# Patient Record
(demographics unavailable — no encounter records)

---

## 2025-03-20 NOTE — CARDIOLOGY SUMMARY
[de-identified] : 12.9.24 NSR with WPW pattern type B 67bpm 10.2020: NSR RSR v1 nondiagnostic  9.2019: NSR low voltage in precordial leads, RSR v1 nondiagnostic  [de-identified] : Holter monitor 6 days 12/9/2024: predominate NSR with intermittent episodes of WPW present. mean HR 75bpm, occasional PACs, few episdoes of pSVT longest 6 beats fastest with HR of 163 beats [de-identified] : TTE 1/7/2025 LVEF 60%, no LA dilation, mild-mod AR, mild MR/TR, trace KS,

## 2025-03-20 NOTE — ASSESSMENT
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 No evidence of WPW on resting ECG today.  No symptoms of SVT. \par  \par  \par  Will obtain Echo and monitor from Dr. Cruz.

## 2025-03-20 NOTE — HISTORY OF PRESENT ILLNESS
[FreeTextEntry1] : This is a 67-year-old-woman with a past history of WPW pattern on her ECG. She has not undergone ablation. She has had no arrhythmia symptoms since her last visit.   SHe had a echo and 24 hour monitor this year with Dr. Cruz.     JOHN KNOX  denies chest pain, chest pressure, shortness of breath, lightheadedness, dizziness, palpitations, syncope, presyncope, orthopnea, PND, or edema.

## 2025-07-17 NOTE — CARDIOLOGY SUMMARY
[de-identified] : 7.17.25:  12.9.24 NSR with WPW pattern type B 67bpm 10.2020: NSR RSR v1 nondiagnostic  9.2019: NSR low voltage in precordial leads, RSR v1 nondiagnostic  [de-identified] : Holter monitor 6 days 12/9/2024: predominate NSR with intermittent episodes of WPW present. mean HR 75bpm, occasional PACs, few episdoes of pSVT longest 6 beats fastest with HR of 163 beats [de-identified] : TTE 1/7/2025 LVEF 60%, no LA dilation, mild-mod AR, mild MR/TR, trace [place],

## 2025-07-17 NOTE — HISTORY OF PRESENT ILLNESS
[FreeTextEntry1] : This is a 67-year-old-woman with a past history of WPW pattern on her ECG. She has not undergone ablation. She has had no arrhythmia symptoms since her last visit. She repeated a monitor- results pending. Mrs. Lipscomb report very brief "fluttering" lasting <5s. Otherwise denies chest pain, chest pressure, shortness of breath, lightheadedness, dizziness, syncope, presyncope, orthopnea, PND, or edema.

## 2025-07-17 NOTE — REVIEW OF SYSTEMS
[SOB] : no shortness of breath [Dyspnea on exertion] : not dyspnea during exertion [Chest Discomfort] : no chest discomfort [Palpitations] : palpitations [Orthopnea] : no orthopnea [Syncope] : no syncope [Dizziness] : no dizziness [Tremor] : a tremor was seen [Negative] : Psychiatric

## 2025-07-17 NOTE — ASSESSMENT
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 No evidence of WPW on resting ECG today.  No symptoms of SVT.   monitor results pending I will call patient with results consider discontinuing Toprol if no arrhythmias in setting of WPW  F/u in 1 yr and sooner if symptoms arise.